# Patient Record
Sex: FEMALE | Race: WHITE
[De-identification: names, ages, dates, MRNs, and addresses within clinical notes are randomized per-mention and may not be internally consistent; named-entity substitution may affect disease eponyms.]

---

## 2020-07-23 NOTE — RAD
EXAM: 

CHEST ONE VIEW



HISTORY:

Shortness of breath since running on Tuesday. Shortness of breath is getting worse.



COMPARISON:

None



FINDINGS:

The cardiac silhouette and pulmonary vasculature is within normal limits. The lungs are clear. The os
seous structures are intact.



IMPRESSION:

No acute cardiopulmonary process.



Reported By: Kamaljit Mosher 

Electronically Signed:  7/23/2020 10:49 PM

## 2021-12-13 ENCOUNTER — HOSPITAL ENCOUNTER (OUTPATIENT)
Dept: HOSPITAL 92 - SCSMRI | Age: 26
Discharge: HOME | End: 2021-12-13
Attending: NURSE PRACTITIONER
Payer: COMMERCIAL

## 2021-12-13 DIAGNOSIS — G43.009: Primary | ICD-10-CM

## 2021-12-13 PROCEDURE — 70553 MRI BRAIN STEM W/O & W/DYE: CPT
